# Patient Record
Sex: FEMALE | Race: WHITE | NOT HISPANIC OR LATINO | URBAN - METROPOLITAN AREA
[De-identification: names, ages, dates, MRNs, and addresses within clinical notes are randomized per-mention and may not be internally consistent; named-entity substitution may affect disease eponyms.]

---

## 2024-02-21 PROBLEM — Z01.419 ENCOUNTER FOR GYNECOLOGICAL EXAMINATION (GENERAL) (ROUTINE) WITHOUT ABNORMAL FINDINGS: Status: RESOLVED | Noted: 2020-11-24 | Resolved: 2024-02-21

## 2024-07-30 ENCOUNTER — OFFICE VISIT (OUTPATIENT)
Dept: FAMILY MEDICINE CLINIC | Facility: CLINIC | Age: 29
End: 2024-07-30
Payer: COMMERCIAL

## 2024-07-30 VITALS
OXYGEN SATURATION: 99 % | RESPIRATION RATE: 20 BRPM | DIASTOLIC BLOOD PRESSURE: 92 MMHG | SYSTOLIC BLOOD PRESSURE: 136 MMHG | TEMPERATURE: 97.9 F | HEIGHT: 63 IN | WEIGHT: 245.8 LBS | BODY MASS INDEX: 43.55 KG/M2 | HEART RATE: 106 BPM

## 2024-07-30 DIAGNOSIS — F41.9 ANXIETY: Primary | ICD-10-CM

## 2024-07-30 DIAGNOSIS — Z11.59 NEED FOR HEPATITIS C SCREENING TEST: ICD-10-CM

## 2024-07-30 DIAGNOSIS — E28.2 PCOS (POLYCYSTIC OVARIAN SYNDROME): ICD-10-CM

## 2024-07-30 DIAGNOSIS — Z11.4 SCREENING FOR HIV (HUMAN IMMUNODEFICIENCY VIRUS): ICD-10-CM

## 2024-07-30 DIAGNOSIS — L30.1 DYSHIDROTIC ECZEMA: ICD-10-CM

## 2024-07-30 DIAGNOSIS — N92.0 MENORRHAGIA WITH REGULAR CYCLE: ICD-10-CM

## 2024-07-30 DIAGNOSIS — F33.1 DEPRESSION, MAJOR, RECURRENT, MODERATE (HCC): ICD-10-CM

## 2024-07-30 DIAGNOSIS — Q51.9 CONGENITAL UTERINE ANOMALY: ICD-10-CM

## 2024-07-30 DIAGNOSIS — R03.0 ELEVATED BP WITHOUT DIAGNOSIS OF HYPERTENSION: ICD-10-CM

## 2024-07-30 DIAGNOSIS — R22.1 FULLNESS OF NECK: ICD-10-CM

## 2024-07-30 PROBLEM — Z13.71 BRCA GENE MUTATION NEGATIVE: Status: ACTIVE | Noted: 2024-07-30

## 2024-07-30 PROCEDURE — 99204 OFFICE O/P NEW MOD 45 MIN: CPT | Performed by: FAMILY MEDICINE

## 2024-07-30 RX ORDER — NORETHINDRONE ACETATE AND ETHINYL ESTRADIOL 1MG-20(21)
1 KIT ORAL DAILY
COMMUNITY

## 2024-07-30 RX ORDER — TRIAMCINOLONE ACETONIDE 1 MG/G
CREAM TOPICAL 2 TIMES DAILY PRN
Qty: 30 G | Refills: 0 | Status: SHIPPED | OUTPATIENT
Start: 2024-07-30

## 2024-07-30 RX ORDER — HYDROXYZINE HYDROCHLORIDE 25 MG/1
25 TABLET, FILM COATED ORAL EVERY 6 HOURS PRN
Qty: 40 TABLET | Refills: 0 | Status: SHIPPED | OUTPATIENT
Start: 2024-07-30

## 2024-07-30 NOTE — PATIENT INSTRUCTIONS
[facility]  Dr Nissa Ugalde  [address]  [address]  [place], [place], [address]  Tel: [phone]      Counseling services:    Go to [employer] to find a therapist near you. You can sort by gender, insurance, issues, etc.       Lori Bond [employer], MUJIN  [address], [address], [place] [address]  [email or web address]  [phone]    [facility]   [address], [address], [place] [address]  [phone]    [place] [employer], [facility]   [address], [address], [place] [address]  [phone]    [facility] [employer], [facility], Anu Callaway M.S., [address], [address]  [place] [address]  Email: [email or web address]  Phone: [phone]    [employer], [facility] (they have equine therapy too)  [address], [address],  [place] [address]  [address], [address],  [place], [place] [address] [phone]     [address]; [place], [place] [address]   [phone]     [facility] [employer] [facility]  [address], [address]  [place], [place] [address]    [phone]     [facility]   [address] [place] [address]     [phone]     [facility]   [address] [place], [place]   [phone]    [facility]  [address] [place] [address]  [phone]    [facility], [facility]   [address] [place] [address]. [address]   [place] [address]  [email or web address]    BLAYNE Vaughan, SWAPNILW  (patients age 11-adult)   [address] [place], [place] [address]   [phone]     Hermann Diallo  [address]   [place] [address]  [phone]     Sridevi Liu, [facility]  [address], [address], [place] [address] [phone]     Cher Condon [facility]  (specializing in addiction)  [address] [place] [address]  [phone]    Joellen Alvarez  [address], [place], [place] [address]   [phone]        Nicolasa Falcon, MS, LPC, [facility]  [address], [place] [address]  [phone]     Cammie Portillo, PsyD  [address] [place], [place] [address]   [phone]    Sharlene Infante, [facility]   [address], [place] [address] [phone]      Hotlines:   Please program these numbers into your phone in case you or someone you know needs them. All services are free.     988  People who call, text, or chat with 988 will be directly connected to the National Suicide Prevention Lifeline. The existing Lifeline phone number (1-115.837.6704) will remain available. Callers can also connect with the [employer] Crisis Line or assistance in Turkish. 988 can be used by anyone, any time, at no cost. Trained crisis response professionals can support individuals considering suicide, self-harm, or any behavioral, substance abuse or misuse or mental health need for themselves or people looking for help for a loved one experiencing a mental health crisis. Lifeline services are available 24 hours a day, seven days a week at no cost to the caller.  Crisis Text Line: text 095230     You are connected to a Crisis Counselor to bring a hot moment to a cool calm through active listening and collaborative problem solving.   WarmLine:  797.757.7325 or 284-147-5077   They provide a supportive listening ear if you need it. They also can also provide information about mental health concerns and services.   Crisis Line:  [facility] [phone],  [facility] [phone], [facility]: [phone]   24/7 crisis counseling, on-site counseling and walk-in counseling services available.   National Suicide Prevention Lifeline:  1-456.298.1936.  En Espanol Nacional de Prevencion del Suicidio 4-199-923-7260   This is free,  confidential, and available 24/7.   [place]: [phone]   For those facing or having survived abuse 24 hour confidential help line, emergency safe house, counseling, advocacy and education.    If you or someone your know are feeling as though you are going to hurt yourself, do not wait - GET HELP RIGHT AWAY.  Go to the closest Emergency Room, call 911, or call someone you trust, family member or friend to be with you until you can get some help.
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
## 2024-08-04 PROBLEM — F33.1 DEPRESSION, MAJOR, RECURRENT, MODERATE (HCC): Status: ACTIVE | Noted: 2024-08-04

## 2024-08-04 PROBLEM — R03.0 ELEVATED BP WITHOUT DIAGNOSIS OF HYPERTENSION: Status: ACTIVE | Noted: 2024-08-04

## 2024-08-04 PROBLEM — Q51.9 CONGENITAL UTERINE ANOMALY: Status: ACTIVE | Noted: 2024-08-04

## 2024-08-04 PROBLEM — F41.9 ANXIETY: Status: ACTIVE | Noted: 2024-08-04

## 2024-08-04 NOTE — ASSESSMENT & PLAN NOTE
Reviewed prior imaging which recommended structural kidney eval given increased likelihood of congenital anomaly

## 2024-08-04 NOTE — ASSESSMENT & PLAN NOTE
Depression and anxiety  Reviewed PHQ/SARAH   Start zoloft as noted  Start hydroxyzine prn    Recommend start therapy

## 2024-08-20 ENCOUNTER — HOSPITAL ENCOUNTER (OUTPATIENT)
Dept: ULTRASOUND IMAGING | Facility: HOSPITAL | Age: 29
Discharge: HOME | End: 2024-08-20
Payer: COMMERCIAL

## 2024-08-20 DIAGNOSIS — Q51.9 CONGENITAL UTERINE ANOMALY: ICD-10-CM

## 2024-08-20 DIAGNOSIS — R22.1 FULLNESS OF NECK: ICD-10-CM

## 2024-08-20 PROCEDURE — 76536 US EXAM OF HEAD AND NECK: CPT

## 2024-08-20 PROCEDURE — 76775 US EXAM ABDO BACK WALL LIM: CPT

## 2024-08-21 DIAGNOSIS — F41.9 ANXIETY: ICD-10-CM

## 2024-08-23 NOTE — TELEPHONE ENCOUNTER
Per patient's last visit with Dr. Carbajal on 07/30/2024:    Depression, major, recurrent, moderate (HCC)        Depression and anxiety  Reviewed PHQ/IRENE   Start zoloft as noted  Start hydroxyzine prn     Recommend start therapy              Relevant Medications     hydrOXYzine HCL (ATARAX) 25 mg tablet     sertraline (ZOLOFT) 50 mg tablet     Anxiety - Primary       See plan under depression           Relevant Medications     hydrOXYzine HCL (ATARAX) 25 mg tablet     sertraline (ZOLOFT) 50 mg tablet     Patient advised to follow-up in 6 weeks (around 09/10/2024) from visit. Patient has an appointment scheduled on 09/24/2024. Patient will need to contact the pharmacy for a refill as patient was given 30 tablets on 07/30/2024 with 1 refill.

## 2024-09-11 ENCOUNTER — RA CDI HCC (OUTPATIENT)
Dept: OTHER | Facility: HOSPITAL | Age: 29
End: 2024-09-11

## 2024-09-11 NOTE — PROGRESS NOTES
NOT on the BPA- please assess using MEAT for 2024 billing     HCC coding opportunities          Chart Reviewed number of suggestions sent to Provider: 1     Patients Insurance        Commercial Insurance: [employer]

## 2024-09-24 ENCOUNTER — OFFICE VISIT (OUTPATIENT)
Dept: FAMILY MEDICINE CLINIC | Facility: CLINIC | Age: 29
End: 2024-09-24
Payer: COMMERCIAL

## 2024-09-24 VITALS
HEIGHT: 63 IN | RESPIRATION RATE: 16 BRPM | WEIGHT: 247.8 LBS | BODY MASS INDEX: 43.91 KG/M2 | HEART RATE: 104 BPM | SYSTOLIC BLOOD PRESSURE: 136 MMHG | TEMPERATURE: 97.9 F | DIASTOLIC BLOOD PRESSURE: 86 MMHG | OXYGEN SATURATION: 99 %

## 2024-09-24 DIAGNOSIS — F41.9 ANXIETY: ICD-10-CM

## 2024-09-24 DIAGNOSIS — F33.1 DEPRESSION, MAJOR, RECURRENT, MODERATE (HCC): ICD-10-CM

## 2024-09-24 DIAGNOSIS — Z00.00 ANNUAL PHYSICAL EXAM: Primary | ICD-10-CM

## 2024-09-24 DIAGNOSIS — R03.0 ELEVATED BP WITHOUT DIAGNOSIS OF HYPERTENSION: ICD-10-CM

## 2024-09-24 PROCEDURE — 99395 PREV VISIT EST AGE 18-39: CPT | Performed by: FAMILY MEDICINE

## 2024-09-24 PROCEDURE — 99214 OFFICE O/P EST MOD 30 MIN: CPT | Performed by: FAMILY MEDICINE

## 2024-09-24 RX ORDER — HYDROXYZINE HYDROCHLORIDE 25 MG/1
TABLET, FILM COATED ORAL
Qty: 60 TABLET | Refills: 1 | Status: SHIPPED | OUTPATIENT
Start: 2024-09-24

## 2024-09-24 NOTE — PROGRESS NOTES
Adult Annual Physical  Name: Simona Santana      : 1995      MRN: [id number]  Encounter Provider: Bindu Carbajal MD  Encounter Date: 2024   Encounter department: [facility]    Assessment & Plan  Annual physical exam  Patient has lab work ordered from last visit.  I encouraged her to get this done fasting as soon as she is able.  I have also encouraged her to schedule her routine appointment with gynecology.  Continue working on healthy diet and exercise habits.  Discussed sleep hygiene  Discussed seasonal flu and COVID vaccinations.  Discussed Tdap which she declines for today.       Anxiety    Increase zoloft to 75 mg daily (one and a half tablets) for 2-3 weeks.  Let me know how you feel with this and if you are tolerating well but not seeing more improvement in anxiety, we can increase the dose to 100 mg daily.      Try taking hydroxyzine during the daytime on a weekend day to see if it is too sedating for you to use as needed if you would feel panicky.    Try increasing the nighttime dose of hydroxyzine to 50 mg at night to see if this helps more for sleep.           Orders:    sertraline (ZOLOFT) 50 mg tablet; Take 1.5 tablets (75 mg total) by mouth daily    hydrOXYzine HCL (ATARAX) 25 mg tablet; Take one po every 6 hours as needed for anxiety and take two po nightly as needed for sleep    Depression, major, recurrent, moderate (HCC)  Depression Screening Follow-up Plan: Patient's depression screening was positive with a PHQ-9 score of 7.   See plan as noted above under anxiety       Elevated BP without diagnosis of hypertension  Blood pressure remains in the borderline range however, patient is quite anxious during the visit, which I suspect is contributing.  Encouraged her to check blood pressure readings at home if she is able.       Immunizations and preventive care screenings were discussed with patient today. Appropriate education was printed on patient's after  visit summary.    Counseling:  Encouraged to schedule GYN visit and dental visit.  Discussed healthy diet and exercise habits.  Discussed sleep hygiene  Encouraged to get lab work        Depression Screening and Follow-up Plan: Patient's depression screening was positive with a PHQ-9 score of 7. Patient with underlying depression and was advised to continue current medications as prescribed. See plan    Tobacco Cessation Counseling: Tobacco cessation counseling was provided.         History of Present Illness     Adult Annual Physical:  Patient presents for annual physical.   Pt here for annual PE and f/u.     Anxiety- Notes that when she feels panicky, she doesn't feel the physical symptoms of anxiety when she is anxious.  She is not getting rapid heart rate or shortness of breath.  However, she notes that the emotional aspect of the anxiety persists.  She had hoped that not feeling physically anxious would help her focus more, but that isn't the case.   Has trouble with focus and concentration.  Thinking about a lot of things all at the same time.  Wife notices that she misses parts of conversation at times.  Patient has trouble focusing on 1 thing at a time.    Sleep isn't great.  Hydroxyzine hasn't been helping much.  She has tried taking 25 mg at night.  She has troubles falling asleep and staying asleep.  She notes that if she falls asleep well, she tends to wake often at night.    She tried the hydroxyzine a few days in a row without much benefit.     Hasn't tried hydroxyzine for anxiety or panic.  She was worried about it making her tired.    Hasn't had labs done- has been working late, eating later in the evening and so she has not had time to fast prior to her labs  .     Diet and Physical Activity:  - Diet/Nutrition:. balanced.  no sugary drinks.  - Exercise:. walks dog, job is more sedentary but tries to take breaks to stand and walk around    Depression Screening:    - PHQ-9 Score: 7    General  "Health:  - Sleep:. Difficulty falling asleep and staying asleep  - Hearing: normal hearing bilateral ears.  - Vision: wears contacts. due for an appt  - Dental: no dental visits for > 1 year, brushes teeth twice daily and does not floss.    /GYN Health:  - Follows with GYN: yes.   - Contraception:. n/a-female partner      Review of Systems      Objective     /86 (BP Location: Left arm, Cuff Size: Large)   Pulse 104   Temp 97.9 °F (36.6 °C)   Resp 16   Ht 5' 3.1\" (1.603 m)   Wt 112 kg (247 lb 12.8 oz)   SpO2 99%   BMI 43.76 kg/m²     BP Readings from Last 3 Encounters:   09/24/24 136/86   07/30/24 136/92   08/29/22 132/82       Physical Exam  Vitals and nursing note reviewed.   Constitutional:       General: She is not in acute distress.     Appearance: Normal appearance. She is well-developed.   HENT:      Head: Normocephalic and atraumatic.   Eyes:      Conjunctiva/sclera: Conjunctivae normal.   Cardiovascular:      Rate and Rhythm: Normal rate and regular rhythm.      Heart sounds: No murmur heard.  Pulmonary:      Effort: Pulmonary effort is normal. No respiratory distress.      Breath sounds: Normal breath sounds.   Abdominal:      Palpations: Abdomen is soft.      Tenderness: There is no abdominal tenderness.   Musculoskeletal:         General: No swelling.      Cervical back: Neck supple.   Skin:     General: Skin is warm and dry.      Capillary Refill: Capillary refill takes less than 2 seconds.   Neurological:      Mental Status: She is alert.   Psychiatric:      Comments: Anxious         "

## 2024-09-24 NOTE — PATIENT INSTRUCTIONS
Get the labs done fasting this week!!  Make sure to schedule an appointment with the gynecologist.    Increase zoloft to 75 mg daily (one and a half tablets) for 2-3 weeks.  Let me know how you feel with this and if you are tolerating well but not seeing more improvement in anxiety, we can increase the dose to 100 mg daily.      Try taking hydroxyzine during the daytime on a weekend day to see if it is too sedating for you to use as needed if you would feel panicky.    Try increasing the nighttime dose of hydroxyzine to 50 mg at night to see if this helps more for sleep.     Monitor home BP readings if you are able - ok to check twice a week.

## 2024-09-25 NOTE — ASSESSMENT & PLAN NOTE
Blood pressure remains in the borderline range however, patient is quite anxious during the visit, which I suspect is contributing.  Encouraged her to check blood pressure readings at home if she is able.

## 2024-09-25 NOTE — ASSESSMENT & PLAN NOTE
Increase zoloft to 75 mg daily (one and a half tablets) for 2-3 weeks.  Let me know how you feel with this and if you are tolerating well but not seeing more improvement in anxiety, we can increase the dose to 100 mg daily.      Try taking hydroxyzine during the daytime on a weekend day to see if it is too sedating for you to use as needed if you would feel panicky.    Try increasing the nighttime dose of hydroxyzine to 50 mg at night to see if this helps more for sleep.           Orders:    sertraline (ZOLOFT) 50 mg tablet; Take 1.5 tablets (75 mg total) by mouth daily    hydrOXYzine HCL (ATARAX) 25 mg tablet; Take one po every 6 hours as needed for anxiety and take two po nightly as needed for sleep

## 2024-09-25 NOTE — ASSESSMENT & PLAN NOTE
Depression Screening Follow-up Plan: Patient's depression screening was positive with a PHQ-9 score of 7.   See plan as noted above under anxiety

## 2024-11-12 ENCOUNTER — APPOINTMENT (OUTPATIENT)
Dept: LAB | Age: 29
End: 2024-11-12
Payer: COMMERCIAL

## 2024-11-12 ENCOUNTER — OFFICE VISIT (OUTPATIENT)
Dept: FAMILY MEDICINE CLINIC | Facility: CLINIC | Age: 29
End: 2024-11-12
Payer: COMMERCIAL

## 2024-11-12 VITALS
HEIGHT: 63 IN | WEIGHT: 251.4 LBS | HEART RATE: 96 BPM | TEMPERATURE: 97.9 F | RESPIRATION RATE: 18 BRPM | OXYGEN SATURATION: 99 % | SYSTOLIC BLOOD PRESSURE: 146 MMHG | DIASTOLIC BLOOD PRESSURE: 94 MMHG | BODY MASS INDEX: 44.54 KG/M2

## 2024-11-12 DIAGNOSIS — F41.9 ANXIETY: ICD-10-CM

## 2024-11-12 DIAGNOSIS — E28.2 PCOS (POLYCYSTIC OVARIAN SYNDROME): ICD-10-CM

## 2024-11-12 DIAGNOSIS — Z11.4 SCREENING FOR HIV (HUMAN IMMUNODEFICIENCY VIRUS): ICD-10-CM

## 2024-11-12 DIAGNOSIS — Z11.59 NEED FOR HEPATITIS C SCREENING TEST: ICD-10-CM

## 2024-11-12 DIAGNOSIS — N92.0 MENORRHAGIA WITH REGULAR CYCLE: ICD-10-CM

## 2024-11-12 DIAGNOSIS — F33.1 DEPRESSION, MAJOR, RECURRENT, MODERATE (HCC): Primary | ICD-10-CM

## 2024-11-12 LAB
ALBUMIN SERPL BCG-MCNC: 3.7 G/DL (ref 3.5–5)
ALP SERPL-CCNC: 70 U/L (ref 34–104)
ALT SERPL W P-5'-P-CCNC: 16 U/L (ref 7–52)
ANION GAP SERPL CALCULATED.3IONS-SCNC: 9 MMOL/L (ref 4–13)
AST SERPL W P-5'-P-CCNC: 17 U/L (ref 13–39)
BASOPHILS # BLD AUTO: 0.06 THOUSANDS/ÂΜL (ref 0–0.1)
BASOPHILS NFR BLD AUTO: 1 % (ref 0–1)
BILIRUB SERPL-MCNC: 0.3 MG/DL (ref 0.2–1)
BUN SERPL-MCNC: 14 MG/DL (ref 5–25)
CALCIUM SERPL-MCNC: 8.4 MG/DL (ref 8.4–10.2)
CHLORIDE SERPL-SCNC: 102 MMOL/L (ref 96–108)
CHOLEST SERPL-MCNC: 166 MG/DL
CO2 SERPL-SCNC: 24 MMOL/L (ref 21–32)
CREAT SERPL-MCNC: 0.69 MG/DL (ref 0.6–1.3)
EOSINOPHIL # BLD AUTO: 0.17 THOUSAND/ÂΜL (ref 0–0.61)
EOSINOPHIL NFR BLD AUTO: 2 % (ref 0–6)
ERYTHROCYTE [DISTWIDTH] IN BLOOD BY AUTOMATED COUNT: 13.8 % (ref 11.6–15.1)
EST. AVERAGE GLUCOSE BLD GHB EST-MCNC: 114 MG/DL
GFR SERPL CREATININE-BSD FRML MDRD: 116 ML/MIN/1.73SQ M
GLUCOSE P FAST SERPL-MCNC: 78 MG/DL (ref 65–99)
HBA1C MFR BLD: 5.6 %
HCT VFR BLD AUTO: 40.6 % (ref 34.8–46.1)
HCV AB SER QL: NORMAL
HDLC SERPL-MCNC: 51 MG/DL
HGB BLD-MCNC: 12.6 G/DL (ref 11.5–15.4)
HIV 1+2 AB+HIV1 P24 AG SERPL QL IA: NORMAL
HIV 2 AB SERPL QL IA: NORMAL
HIV1 AB SERPL QL IA: NORMAL
HIV1 P24 AG SERPL QL IA: NORMAL
IMM GRANULOCYTES # BLD AUTO: 0.04 THOUSAND/UL (ref 0–0.2)
IMM GRANULOCYTES NFR BLD AUTO: 1 % (ref 0–2)
LDLC SERPL CALC-MCNC: 89 MG/DL (ref 0–100)
LYMPHOCYTES # BLD AUTO: 2.77 THOUSANDS/ÂΜL (ref 0.6–4.47)
LYMPHOCYTES NFR BLD AUTO: 33 % (ref 14–44)
MCH RBC QN AUTO: 27.6 PG (ref 26.8–34.3)
MCHC RBC AUTO-ENTMCNC: 31 G/DL (ref 31.4–37.4)
MCV RBC AUTO: 89 FL (ref 82–98)
MONOCYTES # BLD AUTO: 0.51 THOUSAND/ÂΜL (ref 0.17–1.22)
MONOCYTES NFR BLD AUTO: 6 % (ref 4–12)
NEUTROPHILS # BLD AUTO: 4.74 THOUSANDS/ÂΜL (ref 1.85–7.62)
NEUTS SEG NFR BLD AUTO: 57 % (ref 43–75)
NRBC BLD AUTO-RTO: 0 /100 WBCS
PLATELET # BLD AUTO: 366 THOUSANDS/UL (ref 149–390)
PMV BLD AUTO: 9.6 FL (ref 8.9–12.7)
POTASSIUM SERPL-SCNC: 3.7 MMOL/L (ref 3.5–5.3)
PROT SERPL-MCNC: 7.2 G/DL (ref 6.4–8.4)
RBC # BLD AUTO: 4.57 MILLION/UL (ref 3.81–5.12)
SODIUM SERPL-SCNC: 135 MMOL/L (ref 135–147)
T4 FREE SERPL-MCNC: 0.79 NG/DL (ref 0.61–1.12)
TRIGL SERPL-MCNC: 128 MG/DL
TSH SERPL DL<=0.05 MIU/L-ACNC: 4.89 UIU/ML (ref 0.45–4.5)
WBC # BLD AUTO: 8.29 THOUSAND/UL (ref 4.31–10.16)

## 2024-11-12 PROCEDURE — 80061 LIPID PANEL: CPT

## 2024-11-12 PROCEDURE — 87389 HIV-1 AG W/HIV-1&-2 AB AG IA: CPT

## 2024-11-12 PROCEDURE — 86803 HEPATITIS C AB TEST: CPT

## 2024-11-12 PROCEDURE — 36415 COLL VENOUS BLD VENIPUNCTURE: CPT

## 2024-11-12 PROCEDURE — 84443 ASSAY THYROID STIM HORMONE: CPT

## 2024-11-12 PROCEDURE — 85025 COMPLETE CBC W/AUTO DIFF WBC: CPT

## 2024-11-12 PROCEDURE — 80053 COMPREHEN METABOLIC PANEL: CPT

## 2024-11-12 PROCEDURE — 99214 OFFICE O/P EST MOD 30 MIN: CPT | Performed by: FAMILY MEDICINE

## 2024-11-12 PROCEDURE — 83036 HEMOGLOBIN GLYCOSYLATED A1C: CPT

## 2024-11-12 PROCEDURE — 84439 ASSAY OF FREE THYROXINE: CPT

## 2024-11-12 RX ORDER — BUPROPION HYDROCHLORIDE 150 MG/1
150 TABLET ORAL EVERY MORNING
Qty: 30 TABLET | Refills: 2 | Status: SHIPPED | OUTPATIENT
Start: 2024-11-12 | End: 2025-05-11

## 2024-11-12 RX ORDER — SERTRALINE HYDROCHLORIDE 100 MG/1
100 TABLET, FILM COATED ORAL DAILY
Qty: 90 TABLET | Refills: 1 | Status: SHIPPED | OUTPATIENT
Start: 2024-11-12

## 2024-11-12 NOTE — ASSESSMENT & PLAN NOTE
Increase zoloft to 100 mg daily   Add wellbutrin xl 150 mg daily to further improve depression/motivation/focus- reviewed activating effects of wellbutrin, she will let me know if she has any increase in anxiety with the addition of wellbutrin  She will touch base in 1 month, f/u in 2 months    Orders:    buPROPion (WELLBUTRIN XL) 150 mg 24 hr tablet; Take 1 tablet (150 mg total) by mouth every morning

## 2024-11-12 NOTE — PROGRESS NOTES
Ambulatory Visit  Name: Simona Santana      : 1995      MRN: [id number]  Encounter Provider: Bindu Carbajal MD  Encounter Date: 2024   Encounter department: [facility]    Assessment & Plan  Depression, major, recurrent, moderate (HCC)  Increase zoloft to 100 mg daily   Add wellbutrin xl 150 mg daily to further improve depression/motivation/focus- reviewed activating effects of wellbutrin, she will let me know if she has any increase in anxiety with the addition of wellbutrin  She will touch base in 1 month, f/u in 2 months    Orders:    buPROPion (WELLBUTRIN XL) 150 mg 24 hr tablet; Take 1 tablet (150 mg total) by mouth every morning    Anxiety  Inc zoloft to 100 mg daily  Adding wellbutrin for depressive symptoms    Orders:    buPROPion (WELLBUTRIN XL) 150 mg 24 hr tablet; Take 1 tablet (150 mg total) by mouth every morning    sertraline (ZOLOFT) 100 mg tablet; Take 1 tablet (100 mg total) by mouth daily       History of Present Illness       Here for f/u chronic conditions.    Increased anxiety recently with election  Coping w stressors reasonably well.  She notes that her anxiety is improving with Zoloft.  She has occasionally taken the 100 mg dose rather than 75, which she has tolerated well.  She feels less physical symptoms of anxiety now than previous.  Not feeling panicked, not having palpitations/tachycardia, rapid  breathing, etc.    Feels like depression and motivation are more of an issue now.  It takes her longer to get started doing things.  She will find that she procrastinates or will get distracted.  Puts off getting stuff done.  Sluggish.    Her mom moved in with her and her wife. This has been positive.  Her mom is helpful and she notes her mom is doing better which is positive for mom and for pt.                  Review of Systems        Objective     /94 (BP Location: Right arm, Patient Position: Sitting, Cuff Size: Large)   Pulse 96   Temp  "97.9 °F (36.6 °C)   Resp 18   Ht 5' 3.35\" (1.609 m)   Wt 114 kg (251 lb 6.4 oz)   SpO2 99%   BMI 44.04 kg/m²   BP Readings from Last 3 Encounters:   11/12/24 146/94   09/24/24 136/86   07/30/24 136/92     Wt Readings from Last 3 Encounters:   11/12/24 114 kg (251 lb 6.4 oz)   09/24/24 112 kg (247 lb 12.8 oz)   07/30/24 111 kg (245 lb 12.8 oz)       Physical Exam  Vitals and nursing note reviewed.   Constitutional:       Appearance: Normal appearance. She is not ill-appearing.   Neurological:      Mental Status: She is alert.   Psychiatric:      Comments: anxious         "

## 2024-11-12 NOTE — ASSESSMENT & PLAN NOTE
Inc zoloft to 100 mg daily  Adding wellbutrin for depressive symptoms    Orders:    buPROPion (WELLBUTRIN XL) 150 mg 24 hr tablet; Take 1 tablet (150 mg total) by mouth every morning    sertraline (ZOLOFT) 100 mg tablet; Take 1 tablet (100 mg total) by mouth daily

## 2024-11-18 ENCOUNTER — RESULTS FOLLOW-UP (OUTPATIENT)
Dept: FAMILY MEDICINE CLINIC | Facility: CLINIC | Age: 29
End: 2024-11-18

## 2024-11-18 DIAGNOSIS — E03.9 BORDERLINE HYPOTHYROIDISM: Primary | ICD-10-CM

## 2024-12-04 DIAGNOSIS — F33.1 DEPRESSION, MAJOR, RECURRENT, MODERATE (HCC): ICD-10-CM

## 2024-12-04 DIAGNOSIS — F41.9 ANXIETY: ICD-10-CM

## 2024-12-10 RX ORDER — BUPROPION HYDROCHLORIDE 150 MG/1
150 TABLET ORAL EVERY MORNING
Qty: 90 TABLET | Refills: 0 | Status: SHIPPED | OUTPATIENT
Start: 2024-12-10

## 2025-01-21 ENCOUNTER — OFFICE VISIT (OUTPATIENT)
Dept: FAMILY MEDICINE CLINIC | Facility: CLINIC | Age: 30
End: 2025-01-21
Payer: COMMERCIAL

## 2025-01-21 DIAGNOSIS — F33.1 DEPRESSION, MAJOR, RECURRENT, MODERATE (HCC): ICD-10-CM

## 2025-01-21 DIAGNOSIS — R03.0 ELEVATED BP WITHOUT DIAGNOSIS OF HYPERTENSION: ICD-10-CM

## 2025-01-21 DIAGNOSIS — M54.50 RIGHT-SIDED LOW BACK PAIN WITHOUT SCIATICA, UNSPECIFIED CHRONICITY: Primary | ICD-10-CM

## 2025-01-21 PROBLEM — F32.2 SEVERE MAJOR DEPRESSIVE DISORDER (HCC): Status: ACTIVE | Noted: 2025-01-21

## 2025-01-21 PROCEDURE — 99214 OFFICE O/P EST MOD 30 MIN: CPT | Performed by: FAMILY MEDICINE

## 2025-01-21 RX ORDER — METHOCARBAMOL 500 MG/1
500 TABLET, FILM COATED ORAL EVERY 8 HOURS PRN
Qty: 30 TABLET | Refills: 0 | Status: SHIPPED | OUTPATIENT
Start: 2025-01-21

## 2025-01-21 NOTE — PATIENT INSTRUCTIONS
Check your BP at home a few times per week and let me know how your readings look.     Continue current med doses    Continue advil as needed, heat as needed, schedule PT.   Start muscle relaxer but be careful with drowsiness.

## 2025-01-22 VITALS
TEMPERATURE: 99.3 F | BODY MASS INDEX: 45.39 KG/M2 | SYSTOLIC BLOOD PRESSURE: 140 MMHG | OXYGEN SATURATION: 98 % | WEIGHT: 256.2 LBS | DIASTOLIC BLOOD PRESSURE: 92 MMHG | HEIGHT: 63 IN | HEART RATE: 94 BPM

## 2025-01-23 NOTE — ASSESSMENT & PLAN NOTE
BP elevated in office, better on repeat   Monitor ambulatory readings  Discussed that wellbutrin can cause blood pressure elevation

## 2025-01-23 NOTE — ASSESSMENT & PLAN NOTE
Depression Screening Follow-up Plan: Patient's depression screening was positive with a PHQ-9 score of 5. Patient with underlying depression and was advised to continue current medications as prescribed.    Continue wellbutrin xl 150 mg daily, zoloft 100 mg daily

## 2025-01-23 NOTE — PROGRESS NOTES
Assessment & Plan      Assessment & Plan  Right-sided low back pain without sciatica, unspecified chronicity  Low back pain  - Suspected musculoskeletal (low back, R sacroiliac) likely due to lifting heavy objects  - No radicular symptoms, no weakness.  - Referral for physical therapy  - Prescribed robaxin 500 mg every 8 hours as needed, caution advised for drowsiness  - Continue Advil and heat application  - Patient to report efficacy   Orders:  •  Ambulatory Referral to Physical Therapy; Future  •  methocarbamol (ROBAXIN) 500 mg tablet; Take 1 tablet (500 mg total) by mouth every 8 (eight) hours as needed for muscle spasms Caution with drowsiness    Elevated BP without diagnosis of hypertension  BP elevated in office, better on repeat   Monitor ambulatory readings  Discussed that wellbutrin can cause blood pressure elevation       Depression, major, recurrent, moderate (HCC)  Depression Screening Follow-up Plan: Patient's depression screening was positive with a PHQ-9 score of 5. Patient with underlying depression and was advised to continue current medications as prescribed.    Continue wellbutrin xl 150 mg daily, zoloft 100 mg daily                      Most recent labs reviewed      Subjective:     Simona is a 30 y.o. female here today and has the below chronic conditions:    Patient Active Problem List   Diagnosis   • Menorrhagia with irregular cycle   • PCOS (polycystic ovarian syndrome)   • BRCA gene mutation negative   • PCOS (polycystic ovarian syndrome)   • Depression, major, recurrent, moderate (HCC)   • Congenital uterine anomaly   • Anxiety   • Elevated BP without diagnosis of hypertension   • Borderline hypothyroidism   • Severe major depressive disorder (HCC)     Current Outpatient Medications   Medication Sig Dispense Refill   • buPROPion (WELLBUTRIN XL) 150 mg 24 hr tablet TAKE 1 TABLET BY MOUTH EVERY DAY IN THE MORNING 90 tablet 0   • hydrOXYzine HCL (ATARAX) 25 mg tablet Take one po every 6  hours as needed for anxiety and take two po nightly as needed for sleep 60 tablet 1   • methocarbamol (ROBAXIN) 500 mg tablet Take 1 tablet (500 mg total) by mouth every 8 (eight) hours as needed for muscle spasms Caution with drowsiness 30 tablet 0   • sertraline (ZOLOFT) 100 mg tablet Take 1 tablet (100 mg total) by mouth daily 90 tablet 1   • triamcinolone (KENALOG) 0.1 % cream Apply topically 2 (two) times a day as needed (itchy rash) 30 g 0     No current facility-administered medications for this visit.          Chief Complaint   Patient presents with   • Follow-up     HPI:  - CC above per clinical staff and reviewed.    History of Present Illness  The patient presents for evaluation of anxiety and low back pain.    Low Back Pain  - Experienced intermittent low back pain for 2 months, localized slightly to the right, extending to the side of his buttock.  - Pain intensifies with walking and sitting, alleviated by leaning to the opposite side.  - No radiating pain down the leg.  - Difficulty bending over and sleeping on his side.  - Uses 3-4 pillows under his legs while sleeping.  - No difficulty with stairs unless pain is severe.  - Incident of needing assistance to rise from the couch.  - Pain onset after lifting heavy objects.  - Pain improves with rest, worsens throughout the day.  - TENS unit provides temporary distraction, heat more beneficial than ice.  - Advil and lidocaine patches ineffective.  - Prefers knees bent while lying flat, raised on pillows    Mood, anxiety- feels she is doing well w current medications  Taking meds regularly  No side effects that she has noticed.    Supplemental information: Currently on Wellbutrin, Zoloft 100 mg, hydroxyzine as needed.    PHQ-2/9 Depression Screening    Little interest or pleasure in doing things: 1 - several days  Feeling down, depressed, or hopeless: 1 - several days  Trouble falling or staying asleep, or sleeping too much: 1 - several days  Feeling tired  "or having little energy: 1 - several days  Poor appetite or overeatin - not at all  Feeling bad about yourself - or that you are a failure or have let yourself or your family down: 0 - not at all  Trouble concentrating on things, such as reading the newspaper or watching television: 1 - several days  Moving or speaking so slowly that other people could have noticed. Or the opposite - being so fidgety or restless that you have been moving around a lot more than usual: 0 - not at all  Thoughts that you would be better off dead, or of hurting yourself in some way: 0 - not at all  PHQ-9 Score: 5  PHQ-9 Interpretation: Mild depression             The following portions of the patient's history were reviewed and updated as appropriate: allergies, current medications, past family history, past medical history, past social history, past surgical history and problem list.    ROS:  Review of Systems   As noted above.    Objective:      /92 (BP Location: Right arm, Patient Position: Sitting, Cuff Size: Large)   Pulse 94   Temp 99.3 °F (37.4 °C) (Tympanic)   Ht 5' 3.35\" (1.609 m)   Wt 116 kg (256 lb 3.2 oz)   SpO2 98%   BMI 44.88 kg/m²   BP Readings from Last 3 Encounters:   25 140/92   24 146/94   24 136/86     Wt Readings from Last 3 Encounters:   25 116 kg (256 lb 3.2 oz)   24 114 kg (251 lb 6.4 oz)   24 112 kg (247 lb 12.8 oz)               Physical Exam:   Physical Exam  Nursing note reviewed.   Constitutional:       Comments: Uncomfortable sitting, leans to left   Cardiovascular:      Rate and Rhythm: Normal rate and regular rhythm.      Heart sounds: No murmur heard.  Pulmonary:      Effort: Pulmonary effort is normal. No respiratory distress.      Breath sounds: No wheezing or rhonchi.   Musculoskeletal:      Comments: No spinal tenderness  + lumbar paraspinal tenderness right  + SI tenderness right  + lateral buttocks tenderness right     Lymphadenopathy:      " Cervical: No cervical adenopathy.   Neurological:      Mental Status: She is alert and oriented to person, place, and time.      Comments: 5/5 LE strength prox and distal bilaterally              This office visit note was generated in part with the use of [employer] and/or voice recognition dictation.

## 2025-02-04 ENCOUNTER — EVALUATION (OUTPATIENT)
Dept: PHYSICAL THERAPY | Facility: CLINIC | Age: 30
End: 2025-02-04
Payer: COMMERCIAL

## 2025-02-04 DIAGNOSIS — M54.50 RIGHT-SIDED LOW BACK PAIN WITHOUT SCIATICA, UNSPECIFIED CHRONICITY: ICD-10-CM

## 2025-02-04 DIAGNOSIS — M54.50 RIGHT LOW BACK PAIN, UNSPECIFIED CHRONICITY, UNSPECIFIED WHETHER SCIATICA PRESENT: Primary | ICD-10-CM

## 2025-02-04 PROCEDURE — 97110 THERAPEUTIC EXERCISES: CPT | Performed by: PHYSICAL THERAPIST

## 2025-02-04 PROCEDURE — 97161 PT EVAL LOW COMPLEX 20 MIN: CPT | Performed by: PHYSICAL THERAPIST

## 2025-02-04 NOTE — PROGRESS NOTES
PT Evaluation     Today's date: 2025  Patient name: Simona Santana  : 1995  MRN: [id number]  Referring provider: Bindu Carbajal MD  Dx:   Encounter Diagnosis     ICD-10-CM    1. Right low back pain, unspecified chronicity, unspecified whether sciatica present  M54.50                      Assessment  Impairments: abnormal muscle firing, abnormal muscle tone, abnormal or restricted ROM, impaired physical strength, lacks appropriate home exercise program and pain with function    Assessment details: Simona Santana is a pleasant 30 y.o. female presents with R sided LBP with associated muscular restriction. Educated on stretching for these regions and will begin to incorporate core strengthening as pain free ROM improves.     No further referral appears necessary at this time.       Skilled PT services appropriate to facilitate return to prior level of function with transition to home exercise program for independent management when appropriate.    Understanding of Dx/Px/POC: good     Prognosis: good    Goals  Patient will be able to manage symptoms independently  LT weeks  1. pt will reach foto predicted  2. pt will decrease worst pain 75%   ST weeks  1. Pt will decrease worst pain 50%  2. Patient will successfully manage HEP        Plan  Patient would benefit from: skilled PT  Referral necessary: No  Planned modality interventions: thermotherapy: hydrocollator packs    Planned therapy interventions: home exercise program, manual therapy, neuromuscular re-education, patient education, functional ROM exercises, strengthening, stretching, joint mobilization, graded activity, graded exercise, therapeutic exercise, body mechanics training, motor coordination training and activity modification    Frequency: 2x week  Duration in weeks: 12  Treatment plan discussed with: patient        Subjective Evaluation    History of Present Illness  Mechanism of injury: Pt reports R sided LBP for 2.5 months. She  was lifting a 50-75# box and carrying it up the stairs when she noticed pain. She notes relief with legs in supine 90/90 and sitting in a low chair. She uses an SI belt and feels this helps while standing. For work she needs to stand for long periods in one place which is a challenge. No change with muscle relaxer.  She has a hx of R meniscectomy and ACL tear with extensive rehab, never feeling it fully returned to normal.   Pain  Quality: dull ache and sharp          Objective     Passive Range of Motion     Lumbar   Right lateral flexion: with pain Restriction level: minimal  Left rotation:  Restriction level: moderate  Right rotation:  Restriction level: minimal    Additional Passive Range of Motion Details  Pain with return to neutral from flexion    Strength/Myotome Testing     Right Hip   Planes of Motion   Flexion: 4-    Left Knee   Flexion: 4+  Extension: 5    Right Knee   Flexion: 4+  Extension: 4    Tests     Lumbar     Left   Negative slump test.     Right   Negative slump test.     Left Hip   Negative MAZNI and FADIR.     Right Hip   Negative MAZIN and FADIR.     General Comments:      Lumbar Comments  R piriformis and QL restricted and TTP             Precautions: none      Manuals 2/4            R piriformis and QL release CB                                                   Neuro Re-Ed             Pallof press nv                                                                                          Ther Ex             Seated piriformis stretch reviewed            SL QL stretch for right reviewed            Supine iso hip flexion with PPT nv            Pball rollouts reviewed            bridge nv                                                   Ther Activity                                       Gait Training                                       Modalities             MHP supine 90/90 nv

## 2025-02-11 ENCOUNTER — OFFICE VISIT (OUTPATIENT)
Dept: PHYSICAL THERAPY | Facility: CLINIC | Age: 30
End: 2025-02-11
Payer: COMMERCIAL

## 2025-02-11 DIAGNOSIS — M54.50 RIGHT LOW BACK PAIN, UNSPECIFIED CHRONICITY, UNSPECIFIED WHETHER SCIATICA PRESENT: Primary | ICD-10-CM

## 2025-02-11 PROCEDURE — 97112 NEUROMUSCULAR REEDUCATION: CPT

## 2025-02-11 PROCEDURE — 97140 MANUAL THERAPY 1/> REGIONS: CPT

## 2025-02-11 PROCEDURE — 97110 THERAPEUTIC EXERCISES: CPT

## 2025-02-11 NOTE — PROGRESS NOTES
"Daily Note     Today's date: 2025  Patient name: Simona Santana  : 1995  MRN: [id number]  Referring provider: Bindu Carbajal MD  Dx:   Encounter Diagnosis     ICD-10-CM    1. Right low back pain, unspecified chronicity, unspecified whether sciatica present  M54.50                      Subjective: Pt reports some soreness after 1st visit and performing home exercise 2x/day.  Pt c/o pain in right lateral lumbar/hip region mostly today.        Objective: See treatment diary below      Assessment: Tolerated treatment well.  Good tolerance to progression of exercise with verbal/tactile cues provided for proper technique.  Moderate TTP/restriction noted with manual therapy right piriformis and QL regions. Patient would benefit from continued PT      Plan: Continue per plan of care.      Precautions: none      Manuals            R piriformis and QL release CB GH                                                  Neuro Re-Ed             Pallof press nv nv                                                                                         Ther Ex             Seated piriformis stretch reviewed Supine 10\"x10 R           SL QL stretch for right reviewed 10\"x 10           Supine iso hip flexion with PPT nv 10\"x5 each           Pball rollouts reviewed nv           bridge nv 5\"X10                                                  Ther Activity                                       Gait Training                                       Modalities             MHP supine 90/90 nv 10 min during ex                             "

## 2025-02-18 ENCOUNTER — OFFICE VISIT (OUTPATIENT)
Dept: PHYSICAL THERAPY | Facility: CLINIC | Age: 30
End: 2025-02-18
Payer: COMMERCIAL

## 2025-02-18 DIAGNOSIS — M54.50 RIGHT LOW BACK PAIN, UNSPECIFIED CHRONICITY, UNSPECIFIED WHETHER SCIATICA PRESENT: Primary | ICD-10-CM

## 2025-02-18 PROCEDURE — 97110 THERAPEUTIC EXERCISES: CPT

## 2025-02-18 PROCEDURE — 97140 MANUAL THERAPY 1/> REGIONS: CPT

## 2025-02-18 PROCEDURE — 97112 NEUROMUSCULAR REEDUCATION: CPT

## 2025-02-18 NOTE — PROGRESS NOTES
"Daily Note     Today's date: 2025  Patient name: Simona Santana  : 1995  MRN: [id number]  Referring provider: Bindu Carbajal MD  Dx:   Encounter Diagnosis     ICD-10-CM    1. Right low back pain, unspecified chronicity, unspecified whether sciatica present  M54.50                      Subjective: Pt reports improvement with right sided LBP stating she is feeling soreness more than pain.  Pt states 75% improved since starting therapy.        Objective: See treatment diary below.  Pt seen x3 visits with FOTO outcome measure 47 at IE and 73 this visit.        Assessment: Tolerated treatment well.  Good tolerance to progression of exercise with verbal/tactile cues provided for proper technique.  Continued TTP/restriction noted with manual therapy right piriformis region but decreasing.  Patient would benefit from continued PT      Plan: Continue per plan of care.      Precautions: none      Manuals           R piriformis and QL release CB GH GH                                                 Neuro Re-Ed             Pallof press nv nv                                                                                         Ther Ex             Seated piriformis stretch reviewed Supine 10\"x10 R GH          SL QL stretch for right reviewed 10\"x 10 2 min          Supine iso hip flexion with PPT nv 10\"x5 each 10\"x 10 each          Pball rollouts reviewed nv 10\"x 10 L          bridge nv 5\"X10 GH                                                 Ther Activity                                       Gait Training                                       Modalities             MHP supine 90/90 nv 10 min during ex GH                            "

## 2025-02-25 ENCOUNTER — APPOINTMENT (OUTPATIENT)
Dept: PHYSICAL THERAPY | Facility: CLINIC | Age: 30
End: 2025-02-25
Payer: COMMERCIAL

## 2025-02-26 DIAGNOSIS — F41.9 ANXIETY: ICD-10-CM

## 2025-04-13 DIAGNOSIS — F41.9 ANXIETY: ICD-10-CM

## 2025-04-13 DIAGNOSIS — F33.1 DEPRESSION, MAJOR, RECURRENT, MODERATE (HCC): ICD-10-CM

## 2025-04-14 RX ORDER — BUPROPION HYDROCHLORIDE 150 MG/1
150 TABLET ORAL EVERY MORNING
Qty: 90 TABLET | Refills: 0 | Status: SHIPPED | OUTPATIENT
Start: 2025-04-14

## 2025-04-18 ENCOUNTER — PATIENT MESSAGE (OUTPATIENT)
Dept: FAMILY MEDICINE CLINIC | Facility: CLINIC | Age: 30
End: 2025-04-18

## 2025-04-18 DIAGNOSIS — F41.9 ANXIETY: ICD-10-CM

## 2025-04-18 RX ORDER — LORAZEPAM 0.5 MG/1
0.5 TABLET ORAL EVERY 8 HOURS PRN
Qty: 12 TABLET | Refills: 0 | Status: SHIPPED | OUTPATIENT
Start: 2025-04-18

## 2025-04-18 RX ORDER — SERTRALINE HYDROCHLORIDE 100 MG/1
150 TABLET, FILM COATED ORAL DAILY
Qty: 135 TABLET | Refills: 1 | Status: SHIPPED | OUTPATIENT
Start: 2025-04-18

## 2025-04-18 NOTE — PATIENT COMMUNICATION
Lvm for patient to schedule appointment for next week. If no appointments are available, transfer call to .

## 2025-04-18 NOTE — PATIENT COMMUNICATION
Can you please offer her a same day appointment with Dr. Carbajal next week (virtual or in person)

## 2025-04-22 ENCOUNTER — TELEMEDICINE (OUTPATIENT)
Dept: FAMILY MEDICINE CLINIC | Facility: CLINIC | Age: 30
End: 2025-04-22
Payer: COMMERCIAL

## 2025-04-22 ENCOUNTER — TELEPHONE (OUTPATIENT)
Dept: FAMILY MEDICINE CLINIC | Facility: CLINIC | Age: 30
End: 2025-04-22

## 2025-04-22 VITALS — BODY MASS INDEX: 42.05 KG/M2 | WEIGHT: 240 LBS

## 2025-04-22 DIAGNOSIS — F33.1 DEPRESSION, MAJOR, RECURRENT, MODERATE (HCC): Primary | ICD-10-CM

## 2025-04-22 DIAGNOSIS — F51.04 PSYCHOPHYSIOLOGICAL INSOMNIA: ICD-10-CM

## 2025-04-22 PROCEDURE — 99214 OFFICE O/P EST MOD 30 MIN: CPT | Performed by: FAMILY MEDICINE

## 2025-04-22 RX ORDER — TRAZODONE HYDROCHLORIDE 50 MG/1
50 TABLET ORAL
Qty: 30 TABLET | Refills: 1 | Status: SHIPPED | OUTPATIENT
Start: 2025-04-22

## 2025-04-22 NOTE — TELEPHONE ENCOUNTER
Lvm for patient to schedule next appointment which is Return in about 6 weeks (around 6/3/2025) for virtual f/u mood

## 2025-04-22 NOTE — ASSESSMENT & PLAN NOTE
Recent significant marital stressors   Good support from friends/family  Tolerating increased zoloft dose  Sleep has been a struggle- using hydroxyzine without significant relief-  will start trazodone

## 2025-04-22 NOTE — PROGRESS NOTES
Virtual Regular VisitName: Simona Santana      : 1995      MRN: [id number]  Encounter Provider: Bindu Carbajal MD  Encounter Date: 2025   Encounter department: [facility]  :  Assessment & Plan  Depression, major, recurrent, moderate (HCC)  Recent significant marital stressors   Good support from friends/family  Tolerating increased zoloft dose  Sleep has been a struggle- using hydroxyzine without significant relief-  will start trazodone            Psychophysiological insomnia  As above- hydroxyzine not offering much benefit  Start trazodone    Orders:  •  traZODone (DESYREL) 50 mg tablet; Take 1 tablet (50 mg total) by mouth daily at bedtime as needed for sleep        History of Present Illness     HPI    Pt for virtual visit (acute) for worsening depression and anxiety  Recently found out that her wife was having an affair   This has been overwhelming  Wife trying to reflect blame/responsibility to patient    When she reached out last week- increased zoloft and was prescribed ativan for prn use.  Some benefit with this  Sleep continues to be problematic    Friends and family have been supportive  Seeing therapist which is helpful  Plans to file for divorce.    No thoughts of self harm    PHQ-2/9 Depression Screening           Review of Systems    Objective   Wt 109 kg (240 lb)   BMI 42.05 kg/m²     Physical Exam  Vitals and nursing note reviewed.   Constitutional:       Appearance: Normal appearance.   Neurological:      Mental Status: She is alert.   Psychiatric:      Comments: Affect depressed.           Administrative Statements   Encounter provider Bindu Carbajal MD    The Patient is located at Home and in the following state in which I hold an active license [place].    The patient was identified by name and date of birth. Simona Santana was informed that this is a telemedicine visit and that the visit is being conducted through the Epic Embedded platform. She agrees  to proceed..  My office door was closed. No one else was in the room.  She acknowledged consent and understanding of privacy and security of the video platform. The patient has agreed to participate and understands they can discontinue the visit at any time.    I have spent a total time of 30 minutes in caring for this patient on the day of the visit/encounter including Patient and family education, Documenting in the medical record, Reviewing/placing orders in the medical record (including tests, medications, and/or procedures), and Obtaining or reviewing history  , not including the time spent for establishing the audio/video connection.

## 2025-07-16 DIAGNOSIS — F33.1 DEPRESSION, MAJOR, RECURRENT, MODERATE (HCC): ICD-10-CM

## 2025-07-16 DIAGNOSIS — F41.9 ANXIETY: ICD-10-CM

## 2025-07-17 RX ORDER — BUPROPION HYDROCHLORIDE 150 MG/1
150 TABLET ORAL EVERY MORNING
Qty: 90 TABLET | Refills: 0 | Status: SHIPPED | OUTPATIENT
Start: 2025-07-17